# Patient Record
Sex: MALE | Race: WHITE | NOT HISPANIC OR LATINO | Employment: FULL TIME | URBAN - METROPOLITAN AREA
[De-identification: names, ages, dates, MRNs, and addresses within clinical notes are randomized per-mention and may not be internally consistent; named-entity substitution may affect disease eponyms.]

---

## 2017-03-02 ENCOUNTER — TRANSFERRED RECORDS (OUTPATIENT)
Dept: HEALTH INFORMATION MANAGEMENT | Facility: CLINIC | Age: 42
End: 2017-03-02

## 2018-03-06 ENCOUNTER — TELEPHONE (OUTPATIENT)
Dept: PEDIATRICS | Facility: CLINIC | Age: 43
End: 2018-03-06

## 2018-03-07 NOTE — TELEPHONE ENCOUNTER
"3/6/2018: Patient Communication Preferences indicate  Do not contact  and/or communication by \"Phone\" is not preferred. Call not required per Outreach team. SV      "

## 2019-08-15 ENCOUNTER — HOSPITAL ENCOUNTER (OUTPATIENT)
Dept: BEHAVIORAL HEALTH | Facility: CLINIC | Age: 44
Discharge: HOME | End: 2019-08-15
Attending: SOCIAL WORKER | Admitting: SOCIAL WORKER
Payer: COMMERCIAL

## 2019-08-15 PROCEDURE — H0001 ALCOHOL AND/OR DRUG ASSESS: HCPCS

## 2019-08-15 ASSESSMENT — ANXIETY QUESTIONNAIRES
5. BEING SO RESTLESS THAT IT IS HARD TO SIT STILL: SEVERAL DAYS
GAD7 TOTAL SCORE: 5
6. BECOMING EASILY ANNOYED OR IRRITABLE: SEVERAL DAYS
2. NOT BEING ABLE TO STOP OR CONTROL WORRYING: SEVERAL DAYS
7. FEELING AFRAID AS IF SOMETHING AWFUL MIGHT HAPPEN: NOT AT ALL
3. WORRYING TOO MUCH ABOUT DIFFERENT THINGS: SEVERAL DAYS
1. FEELING NERVOUS, ANXIOUS, OR ON EDGE: SEVERAL DAYS
IF YOU CHECKED OFF ANY PROBLEMS ON THIS QUESTIONNAIRE, HOW DIFFICULT HAVE THESE PROBLEMS MADE IT FOR YOU TO DO YOUR WORK, TAKE CARE OF THINGS AT HOME, OR GET ALONG WITH OTHER PEOPLE: NOT DIFFICULT AT ALL
4. TROUBLE RELAXING: NOT AT ALL

## 2019-08-15 ASSESSMENT — PAIN SCALES - GENERAL: PAINLEVEL: NO PAIN (0)

## 2019-08-15 ASSESSMENT — PATIENT HEALTH QUESTIONNAIRE - PHQ9: SUM OF ALL RESPONSES TO PHQ QUESTIONS 1-9: 2

## 2019-08-15 NOTE — PROGRESS NOTES
[facility]  [address], [address]  [place] [address]        ADULT CD ASSESSMENT ADDENDUM      Patient Name: Jose Peralta  Cell Phone:   Home: none (home)    Mobile:   Telephone Information:   Mobile [phone]       Email:  None  Emergency Contact: Chanda Trevizo   Tel: [phone]    The patient reported being:      With which race do you identify? White    Initial Screening Questions     1. Are you currently having severe withdrawal symptoms that are putting yourself or others in danger?  No    2. Are you currently having severe medical problems that require immediate attention?  No    3. Are you currently having severe emotional or behavioral problems that are putting yourself or others at risk of harm?  No    4. Do you have sufficient reading skills that will enable you to understand written materials, including the program rules and client rights materials?  Yes     Family History and other additional information     Who raised you? (parents, grandparents, adoptive parents, step-parents, etc.)    Both Parents    Please tell me what it was like growing up in your family. (please include any history of substance abuse, mental health issues, emotional/physical/sexual abuse, forms of discipline, and support)     Raised by biological parents, still .  2 sister and 1 brother, youngest.  Family hx substance use, but unknown because people are high functioning.    Do you have any children or Stepchildren? Yes, explain: 2 sons    Are you being investigated by Child Protection Services? No    Do you have a child protection worker, probation office or ?  No    How would you describe your current finances?  Doing okay    If you are having problems, (unpaid bills, bankruptcy, IRS problems) please explain:  No    If working or a student are you able to function appropriately in that setting? Yes     Describe your preferred learning style:  by hands-on practice and  by watching someone else demonstrate    What are your some of your personal strengths?  Goldei, and done it before.    Do you currently participate in community goldie activities, such as attending Islam, temple, Yazidi or Gnosticist services?  Yes, explain: I go every once in awhile    How does your spirituality impact your recovery?  Supports being a good person    Do you currently self-administer your medications?  The patient is not currently taking any prescription or over the counter medications.    Have you ever had to lie to people important to you about how much you murray?   No   Have you ever felt the need to bet more and more money?   No   Have you ever attempted treatment for a gambling problem?   No   Have you ever touched or fondled someone else inappropriately or forced them to have sex with you against their will?   No   Are you or have you ever been a registered sex offender?   No   Is there any history of sexual abuse in your family? No   Have you ever felt obsessed by your sexual behavior, such as having sex with many partners, masturbating often, using pornography often?   No     Have you ever received therapy or stayed in the hospital for mental health problems?   No     Have you ever hurt yourself, such as cutting, burning or hitting yourself?   No     Have you ever purged, binged or restricted yourself as a way to control your weight?   No     Are you on a special diet?   No     Do you have any concerns regarding your nutritional status?   No     Have you had any appetite changes in the last 3 months?   No   Have you had weight loss or weight gain of more than 10 lbs in the last 3 months?   If patient gained or lost more than 10 lbs, then refer to program RN / attending Physician for assessment.   No   Was the patient informed of BMI?       No   Have you engaged in any risk-taking behavior that would put you at risk for exposure to blood-borne or sexually transmitted diseases?   No   Do you  have any dental problems?   No   Have you ever lived through any trauma or stressful life events?   Yes, explain: current custody issues   In the past month, have you had any of the following symptoms related to the trauma listed above? (dreams, intense memories, flashbacks, physical reactions, etc.)   No   Have you ever believed people were spying on you, or that someone was plotting against you or trying to hurt you?   No   Have you ever believed someone was reading your mind or could hear your thoughts or that you could actually read someone's mind or hear what another person was thinking?   No   Have you ever believed that someone of some force outside of yourself was putting thoughts into your mind or made you act in a way that was not your usual self?  Have you ever though you were possessed?   No   Have you ever believed you were being sent special messages through the TV, radio or newspaper?   No   Have you ever heard things other people couldn't hear, such as voices or other noises?   No   Have you ever had visions when you were awake?  Or have you ever seen things other people couldn't see?   No   Do you have a valid 's license?    Yes     PHQ-9, LUIS-7 and Suicide Risk Assessm2 out of 27, indicating minimal depression.ent   PHQ-9 on 8/15/2019 LUIS-7 on 8/15/2019   The patient's PHQ-9 score was    The patient's LUIS-7 score was 5 out of 21, indicating mild anxiety.       [place]-Suicide Severity Rating Scale   Suicide Ideation   1.) Have you ever wished you were dead or that you could go to sleep and not wake up?     Lifetime:  No   Past Month:  No     2.) Have you actually had any thoughts of killing yourself?   Lifetime:  No   Past Month:  No     3.) Have you been thinking about how you might do this?     Lifetime:  No   Past Month:  No     4.) Have you had these thoughts and had some intention of acting on them?     Lifetime:  No   Past Month:  No     5.) Have you started to work out the details of  how to kill yourself?   Lifetime:  No   Past Month:  No     6.) Do you intend to carry out this plan?      Lifetime:  No   Past Month:  No     Intensity of Ideation   Intensity of ideation (1 being least severe, 5 being most severe):     Lifetime:  The patient denied ever having any suicidal thoughts in life.   Past Month:  The patient denied having any suicidal thoughts within the past month.     How often do you have these thoughts?  The patient denied ever having any suicidal thoughts in life.     When you have the thoughts how long do they last?  The patient denied ever having any suicidal thoughts in life.     Can you stop thinking about killing yourself or wanting to die if you want to?  The patient denied ever having any suicidal thoughts in life.     Are there things - anyone or anything (i.e. family, Taoist, pain of death) that stopped you from wanting to die or acting on thoughts of suicide?  Does not apply     What sort of reasons did you have for thinking about wanting to die or killing yourself (ie end pain, stop how you were feeling, get attention or reaction, revenge)?  Does not apply     Suicidal Behavior   (Suicide Attempt) - Have you made a suicide attempt?     Lifetime:  The patient had never made a suicide attempt.   Past Month:  The patient had not made a suicide attempt within the past month.     Have you engaged in self-harm (non-suicidal self-injury)?  The patient denied having any history of engaging in self-harm (non-suicidal self-injury).     (Interrupted Attempt) - Has there been a time when you started to do something to end your life but someone or something stopped you before you actually did anything?  The patient denied having any history of an interrupted suicide attempt.     (Aborted or Self-Interrupted Attempt) - Has there been a time when you started to do something to try to end your life but you stopped yourself before you actually did anything?  The patient denied having any  "history of an aborted or self-interrupted suicide attempt.     (Preparatory Acts of Behavior) - Have you taken any steps towards making suicide attempt or preparing to kill yourself (such as collecting pills, getting a gun, giving valuables away or writing a suicide note)?  No     Actual Lethality/Medical Damage:  The patient denied ever making a suicidal attempt.       2008  [facility]  Used with permission by Tamika Agee, PhD.       Guide to C-SSRS Risk Ratings   NO IDEATION:  with no active thoughts IDEATION: with a wish to die. IDEATION: with active thoughts. Risk Ratings   If Yes No No 0 - Very Low Risk   If NA Yes No 1 - Low Risk   If NA Yes Yes 2 - Low/moderate risk   IDEATION: associated thoughts of methods without intent or plan INTENT: Intent to follow through on suicide PLAN: Plan to follow through on suicide Risk Ratings cont...   If Yes No No 3 - Moderate Risk   If Yes Yes No 4 - High Risk   If Yes Yes Yes 5 - High Risk   The patient's ADDITIONAL RISK FACTORS and lack of PROTECTIVE FACTORS may increase their overall suicide risk ratings.     Additional Risk Factors:    A recent loss that was significant to the patient, i.e. loss of job, loss of home, divorce, break-up, etc.     Substance use   Protective Factors:    Having people in his/her life that would prevent the patient from considering a suicide attempt (i.e. young children, spouse, parents, etc.)     Having easy access to supportive family members     Risk Status   Past month:0. - Very Low Risk:  Evaluation Counselors:  [facility] \"Very Low Risk\".      Treatment Counselors:  Reassess upon admission as applicable, assess weekly in progress notes under Dimension 3 and summarize in Discharge / Treatment summary under Dimension 3.    Past 24 hours:0. - Very Low Risk:  Evaluation Counselors:  Document in Epic / [facility] to counselor \"Very Low Risk\".      Treatment Counselors:  Reassess upon " admission as applicable, assess weekly in progress notes under Dimension 3 and summarize in Discharge / Treatment summary under Dimension [facility].   Additional information to support suicide risk rating: There was no additional information to provide at this time.     Mental Health Status   Physical Appearance/Attire: Appears stated age and Attire appropriate to age/situation   Hygiene: well groomed   Eye Contact: at examiner   Speech Rate:  regular   Speech Volume: regular   Speech Quality: fluid   Cognitive/Perceptual:  reality based   Cognition: memory intact    Judgment: intact   Insight: intact   Orientation:  time, place, person and situation   Thought: logical    Hallucinations:  none   General Behavioral Tone: cooperative   Psychomotor Activity: no problem noted   Gait:  no problem   Mood: appropriate   Affect: congruence/appropriate   Counselor Notes: NA     Criteria for Diagnosis: DSM-5 Criteria for Substance Use Disorders      The patient does not currently identify with a DSM-5 substance use disorder.    Level of Care   I.) Intoxication and Withdrawal: 0   II.) Biomedical:  0   III.) Emotional and Behavioral:  1   IV.) Readiness to Change:  1   V.) Relapse Potential: 1   VI.) Recovery Environmental: 1     Initial Problem List     The patient has current child protection and/or child custody issues    Patient/Client is willing to follow treatment recommendations.  Yes    Counselor: Marcy Valera [facility]      Vulnerable Adult Checklist for OUTPATIENTS     1.  Do you have a physical, emotional or mental infirmity or dysfunction?       No    2.  Does this issue impair your ability to provide for your own care without help, including providing yourself with food, shelter, clothing, healthcare or supervision?       No    3.  Because of this issue, I need assistance to protect myself from maltreatment by others.      No    Based on the above information:    This person is not a functional Vulnerable Adult  according to [place] Statute 626.5572 subdivision 21.

## 2019-08-15 NOTE — PROGRESS NOTES
Rule 25 Assessment  Background Information   1. Date of Assessment Request  2. Date of Assessment  8/15/2019 3. Date Service Authorized     4.   KALEY Nolan   5.  Phone Number   [phone] 6. Referent   7. Assessment Site  [facility]     8. Client Name   Jose Peralta 9. Date of Birth  1975 Age  44 year old 10. Gender  male  11. PMI/ Insurance No.  [id number]   12. Client's Primary Language:  English 13. Do you require special accommodations, such as an  or assistance with written material? No   14. Current Address: [address] [place] [address]   15. Client Phone Numbers: none (home)      16. Tell me what has happened to bring you here today.    When I had my youngest son, I told her I would not drink around him and I was sober at that time. I have since started drinking again and I was honest with her that I did drink one night after he want to bed and then she went and got a  and took custody of my son. I was only able to have supervised visit so I now have a  too and we are going through this whole court thing.     I did have a hair test it tested positive for marijuana and cocaine.    17. Have you had other rule 25 assessments?     No    DIMENSION I - Acute Intoxication /Withdrawal Potential   1. Chemical use most recent 12 months outside a facility and other significant use history (client self-report)              X = Primary Drug Used   Age of First Use Most Recent Pattern of Use and Duration   Need enough information to show pattern (both frequency and amounts) and to show tolerance for each chemical that has a diagnosis   Date of last use and time, if needed   Withdrawal Potential? Requiring special care Method of use  (oral, smoked, snort, IV, etc)      Alcohol     17 past 2-3 years: 2x/month, 1-8 beers (50/50)  1037-4298: No use  1590-8267 (): 3-4x/week, binge drinking   8/7/19 no oral       Marijuana/  Hashish   17 Past 2-3 years: Up to cpl times/week, 1-2 hits.  Eighth/month.  9125-1987: no use  Prior: daily 2 weeks ago no smoke      Cocaine/Crack     Mid 20's Cocaine  Past 2-3 years: 3x  2013-: no use  Prior: weekly (with the drinking) 2019 no snort      Meth/  Amphetamines   No use          Heroin     No use          Other Opiates/  Synthetics   No use          Inhalants     No use          Benzodiazepines     No use          Hallucinogens     No use          Barbiturates/  Sedatives/  Hypnotics No use          Over-the-Counter Drugs   No use          Other     No use          Nicotine     unsp Occasional  cpl times/month        2. Do you use greater amounts of alcohol/other drugs to feel intoxicated or achieve the desired effect?  Yes.  Or use the same amount and get less of an effect?  Yes.  Example: in the past    3A. Have you ever been to detox?     Yes    3B. When was the first time?      shortly at [facility]    3C. How many times since then?     0    3D. Date of most recent detox:         4.  Withdrawal symptoms: Have you had any of the following withdrawal symptoms?  Past 12 months Recent (past 30 days)   None None     's Visual Observations and Symptoms: alert and oriented    Based on the above information, is withdrawal likely to require attention as part of treatment participation?  No    Dimension I Ratings   Acute intoxication/Withdrawal potential - The placing authority must use the criteria in Dimension I to determine a client s acute intoxication and withdrawal potential.    RISK DESCRIPTIONS - Severity ratin Client displays full functioning with good ability to tolerate and cope with withdrawal discomfort. No signs or symptoms of intoxication or withdrawal or resolving signs or symptoms.    REASONS SEVERITY WAS ASSIGNED (What about the amount of the person s use and date of most recent use and history of withdrawal problems suggests the potential of  withdrawal symptoms requiring professional assistance? )     No concern.         DIMENSION II - Biomedical Complications and Conditions   1a. Do you have any current health/medical conditions?(Include any infectious diseases, allergies, or chronic or acute pain, history of chronic conditions)       No    1b. On a scale of mild, moderate to severe please specify the severity of the patient's diabetes and/or neuropathy.    The patient denied having a history of being diagnosed with diabetes or neuropathy.    2. Do you have a health care provider? When was your most recent appointment? What concerns were identified?     [facility]    3. If indicated by answers to items 1 or 2: How do you deal with these concerns? Is that working for you? If you are not receiving care for this problem, why not?      The patient denied having any current clinical health issues.    4A. List current medication(s) including over-the-counter or herbal supplements--including pain management:     None    4B. Do you follow current medical recommendations/take medications as prescribed?     The patient denied taking any prescription or over the counter medications at this time.    4C. When did you last take your medication?     The patient denied taking any prescription or over the counter medications at this time.    4D. Do you need a referral to have a follow up with a primary care physician?    No.    5. Has a health care provider/healer ever recommended that you reduce or quit alcohol/drug use?     No    6. Are you pregnant?     NA, because the patient is male    7. Have you had any injuries, assaults/violence towards you, accidents, health related issues, overdose(s) or hospitalizations related to your use of alcohol or other drugs:     No    8. Do you have any specific physical needs/accommodations? No    Dimension II Ratings   Biomedical Conditions and Complications - The placing authority must use the criteria in Dimension II to determine a  client s biomedical conditions and complications.   RISK DESCRIPTIONS - Severity ratin Client displays full functioning with good ability to cope with physical discomfort.    REASONS SEVERITY WAS ASSIGNED (What physical/medical problems does this person have that would inhibit his or her ability to participate in treatment? What issues does he or she have that require assistance to address?)    No health concerns reported.         DIMENSION III - Emotional, Behavioral, Cognitive Conditions and Complications   1. (Optional) Tell me what it was like growing up in your family. (substance use, mental health, discipline, abuse, support). Do you have a family history of Substance Use Disorders?    Raised by biological parents, still .  2 sister and 1 brother, youngest.  Family hx substance use, but unknown because people are high functioning.    2. When was the last time that you had significant problems...  A. with feeling very trapped, lonely, sad, blue, depressed or hopeless  about the future? Never    B. with sleep trouble, such as bad dreams, sleeping restlessly, or falling  asleep during the day? Past Month    C. with feeling very anxious, nervous, tense, scared, panicked, or like  something bad was going to happen? Never    D. with becoming very distressed and upset when something reminded  you of the past? Past Month    E. with thinking about ending your life or committing suicide? Never    3. When was the last time that you did the following things two or more times?  A. Lied or conned to get things you wanted or to avoid having to do  something? Never    B. Had a hard time paying attention at school, work, or home? Past Month    C. Had a hard time listening to instructions at school, work, or home? Never    D. Were a bully or threatened other people? Never    E. Started physical fights with other people? Never    Note: These questions are from the Global Appraisal of Individual Needs--Short Screener.  Any item marked  past month  or  2 to 12 months ago  will be scored with a severity rating of at least 2.     For each item that has occurred in the past month or past year ask follow up questions to determine how often the person has felt this way or has the behavior occurred? How recently? How has it affected their daily living? And, whether they were using or in withdrawal at the time?    If I wake up I have a hard time to going back to sleep now with everything going on.  Since all this happened I am mad, real mad and not being myself.    4A. If the person has answered item 2E with  in the past year  or  the past month , ask about frequency and history of suicide in the family or someone close and whether they were under the influence.     Denies.    Any history of suicide in your family? Or someone close to you?     The patient denied any family member or someone close to the patient had ever completed suicide.    4B. If the person answered item 2E  in the past month  ask about  intent, plan, means and access and any other follow-up information  to determine imminent risk. Document any actions taken to intervene  on any identified imminent risk.      The patient denied having any suicide ideation within the past month.    5A. Have you ever been diagnosed with a mental health problem?     No      5B. Are you receiving care for any mental health issues? If yes, what is the focus of that care or treatment?  Are you satisfied with the service? Most recent appointment?  How has it been helpful?     I am seeing a devyn periodically as I can around.  It is a family psychologist, last appt 2 weeks.  It is helpful.    6. Have you been prescribed medications for emotional/psychological problems?     The patient denied having any history of being prescribed psychotropic medications for mental health issues.    7. Does your MH provider know about your use?     Yes.  7B. What does he or she have to say about it?(DSM) the same  thing about the vulnerablity.    8A. Have you ever been verbally, emotionally, physically or sexually abused?      No     Follow up questions to learn current risk, continuing emotional impact.      The patient denied having any history of being verbally, emotionally, physically or sexually abused.    8B. Have you received counseling for abuse?      The patient denied having any history of being verbally, emotionally, physically or sexually abused.    9. Have you ever experienced or been part of a group that experienced community violence, historical trauma, rape or assault?     No    10A. :    No    11. Do you have problems with any of the following things in your daily life?    Concentration      Note: If the person has any of the above problems, follow up with items 12, 13, and 14. If none of the issues in item 11 are a problem for the person, skip to item 15.    I am just in a rut right now.    12. Have you been diagnosed with traumatic brain injury or Alzheimer s?  No    13. If the answer to #12 is no, ask the following questions:    Have you ever hit your head or been hit on the head? Yes    Were you ever seen in the Emergency Room, hospital or by a doctor because of an injury to your head? No    Have you had any significant illness that affected your brain (brain tumor, meningitis, West Nile Virus, stroke or seizure, heart attack, near drowning or near suffocation)? No    14. If the answer to #12 is yes, ask if any of the problems identified in #11 occurred since the head injury or loss of oxygen. No    15A. Highest grade of school completed:     Some college, but no degree    15B. Do you have a learning disability? No    15C. Did you ever have tutoring in Math or English? No    15D. Have you ever been diagnosed with Fetal Alcohol Effects or Fetal Alcohol Syndrome? No    16. If yes to item 15 B, C, or D: How has this affected your use or been affected by your use?     The patient denied having any  history of a learning disability, tutoring in math or English or being diagnosed with Fetal Alcohol Effects or Fetal Alcohol Syndrome.    Dimension III Ratings   Emotional/Behavioral/Cognitive - The placing authority must use the criteria in Dimension III to determine a client s emotional, behavioral, and cognitive conditions and complications.   RISK DESCRIPTIONS - Severity ratin Client has impulse control and coping skills. Client presents a mild to moderate risk of harm to self or others or displays symptoms of emotional, behavioral or cognitive problems. Client has a mental health diagnosis and is stable. Client functions adequately in significant life areas.    REASONS SEVERITY WAS ASSIGNED - What current issues might with thinking, feelings or behavior pose barriers to participation in a treatment program? What coping skills or other assets does the person have to offset those issues? Are these problems that can be initially accommodated by a treatment provider? If not, what specialized skills or attributes must a provider have?    Client denies any history of mental health diagnosis.  He does report currently seeing an individual therapist.  He has some significant stressors right now which he acknowledges are creating some unsettled emotions.  He denies any suicidal ideation.  PHQ-9 score and LUIS-7 score indicate minimal depression and mild anxiety.         DIMENSION IV - Readiness for Change   1. You ve told me what brought you here today. (first section) What do you think the problem really is?     I don't think I am chemically dependent anymore, I do indulge a little bit now.    2. Tell me how things are going. Ask enough questions to determine whether the person has use related problems or assets that can be built upon in the following areas: Family/friends/relationships; Legal; Financial; Emotional; Educational; Recreational/ leisure; Vocational/employment; Living arrangements (DSM)      Custody  issues of youngest son.     3. What activities have you engaged in when using alcohol/other drugs that could be hazardous to you or others (i.e. driving a car/motorcycle/boat, operating machinery, unsafe sex, sharing needles for drugs or tattoos, etc     Hx of driving    4. How much time do you spend getting, using or getting over using alcohol or drugs? (DSM)     Minimal    5. Reasons for drinking/drug use (Use the space below to record answers. It may not be necessary to ask each item.)  Like the feeling Yes   Trying to forget problems No   To cope with stress No   To relieve physical pain No   To cope with anxiety Yes   To cope with depression No   To relax or unwind Yes   Makes it easier to talk with people No   Partner encourages use No   Most friends drink or use No   To cope with family problems No   Afraid of withdrawal symptoms/to feel better No   Other (specify)  No     A. What concerns other people about your alcohol or drug use/Has anyone told you that you use too much? What did they say? (DSM)     I know it worries my mom.  I think the concerns have been with the progression of the use, and concern I will go back down that road.    B. What did you think about that/ do you think you have a problem with alcohol or drug use?     I don't really have the consequences anymore, this was just th perfect storm that hit when I took this test.  I do have the same concerns.    6. What changes are you willing to make? What substance are you willing to stop using? How are you going to do that? Have you tried that before? What interfered with your success with that goal?      Get through this court stuff and then we will see.  I will do whatever I have to do.  I'm going to have to go to [facility] and have a breathalyzer.    I think for me it is abstinence right now.    7. What would be helpful to you in making this change?     I need to take care of myself, my mind and my body.  I need to use the tools I know and  surround myself with my support.    Dimension IV Ratings   Readiness for Change - The placing authority must use the criteria in Dimension IV to determine a client s readiness for change.   RISK DESCRIPTIONS - Severity ratin Client is motivated with active reinforcement, to explore treatment and strategies for change, but ambivalent about illness or need for change.    REASONS SEVERITY WAS ASSIGNED - (What information did the person provide that supports your assessment of his or her readiness to change? How aware is the person of problems caused by continued use? How willing is she or he to make changes? What does the person feel would be helpful? What has the person been able to do without help?)      Client appeared forthcoming and was compliant with interview.  He recognizes abstinence in his best interest.         DIMENSION V - Relapse, Continued Use, and Continued Problem Potential   1A. In what ways have you tried to control, cut-down or quit your use? If you have had periods of sobriety, how did you accomplish that? What was helpful? What happened to prevent you from continuing your sobriety? (DSM)     Sober for 5 years    1B. What were the circumstances of your most recent relapse with mood altering chemicals?    I just kind of slid out of it.    2. Have you experienced cravings? If yes, ask follow up questions to determine if the person recognizes triggers and if the person has had any success in dealing with them.     Not really, I get excited sometimes when I know I am going to have some beers but not lately.    3. Have you been treated for alcohol/other drug abuse/dependence? Please List the names/locations/approximate dates of previous treatments:    20 years ago [facility] (outpatient)   [place], I knew I needed help.  2013 [facility] and then went to [facility] afterward.    4. Support group participation: Have you/do you attend support group meetings to reduce/stop your alcohol/drug  use? How recently? What was your experience? Are you willing to restart? If the person has not participated, is he or she willing?     I have flirted with going back to AA but not sure.  I was doing the deal for most of the time when I was sober.    5. What would assist you in staying sober/straight?     Getting back reconnected with AA and support people.    Dimension V Ratings   Relapse/Continued Use/Continued problem potential - The placing authority must use the criteria in Dimension V to determine a client s relapse, continued use, and continued problem potential.   RISK DESCRIPTIONS - Severity ratin Client recognizes relapse issues and prevention strategies, but displays some vulnerability for further substance use or mental health problems.    REASONS SEVERITY WAS ASSIGNED - (What information did the person provide that indicates his or her understanding of relapse issues? What about the person s experience indicates how prone he or she is to relapse? What coping skills does the person have that decrease relapse potential?)      Client reports a period of abstinence.  He admits to recent use and is experiencing some negative consequences as a result. Client has had previous treatment and is able to identify coping mechanisms to avoid problems with use and maintain abstinence.           DIMENSION VI - Recovery Environment   1. Are you employed/attending school? Tell me about that.     Work for a [employer].  .    2A. Describe a typical day; evening for you. Work, school, social, leisure, volunteer, spiritual practices. Include time spent obtaining, using, recovering from drugs or alcohol. (DSM)     Work 8-5, Monday through Friday.  And then this summer it has been traveling baseball which was a huge committment.  I mow the lawn, dishes and laundry.    Please describe what leisure activities have been associated with your substance abuse:     Sometimes just to relax or at an  event.    2B. How often do you spend more time than you planned using or use more than you planned? (DSM)     None current.    3. How important is using to your social connections? Do many of your family or friends use?     I am still really plugged into the sober community.    4A. Are you currently in a significant relationship?     Yes.  4B. How long? About 2 years, but currently have some space between us            Please describe your significant other's use of mood altering chemicals? Social use    4C. Sexual Orientation:     Heterosexual    5A. Who do you live with?      Son half the time.    5B. Tell me about their alcohol/drug use and mental health issues.     Live alone most times    5C. Are you concerned for your safety there? No    5D. Are you concerned about the safety of anyone else who lives with you? No    6A. Do you have children who live with you?     Yes.  (Ask follow-up questions to determine the person's relationship and responsibility, both legal and care giving; and what arrangements are made for supervision for the children when the person is not available.) 50/50 with my first son (11yo)    6B. Do you have children who do not live with you?     Yes.  (Ask follow-up questions to determine the person's relationship and responsibility, both legal and care giving; and what arrangements are made for supervision for the children when the person is not available.) son (3yo), right now have supervised visits so do that with my mom.    7A. Who supports you in making changes in your alcohol or drug use? Would you like your family to participate in your treatment? If so, how? What are they willing to do to support you? Who is upset or angry about you making changes in your alcohol or drug use? How big a problem is this for you?      Many friends and family, girlfriend    7B. This table is provided to record information about the person s relationships and available support It is not necessary to ask  each item; only to get a comprehensive picture of their support system.  How often can you count on the following people when you need someone?   Partner / Spouse Usually supportive   Parent(s)/Aunt(s)/Uncle(s)/Grandparents Always supportive   Sibling(s)/Cousin(s) Always supportive   Child(elaine) Always supportive   Other relative(s) Always supportive   Friend(s)/neighbor(s) Usually supportive   Child(elaine) s father(s)/mother(s) Always supportive  Rarely supportive   Support group member(s) Always supportive   Community of ute members Always supportive   /counselor/therapist/healer Always supportive   Other (specify) No       8A. What is your current living situation?     Independent living    8B. What is your long term plan for where you will be living?     No change.    8C. Tell me about your living environment/neighborhood? Ask enough follow up questions to determine safety, criminal activity, availability of alcohol and drugs, supportive or antagonistic to the person making changes.      No concerns.    9. Criminal justice history: Gather current/recent history and any significant history related to substance use--Arrests? Convictions? Circumstances? Alcohol or drug involvement? Sentences? Still on probation or parole? Expectations of the court? Current court order? Any sex offenses - lifetime? What level? (DSM)    Denies any current legal or probation  Hx: 3x DWI's (20 years ago), 1 marijuana possession    10. What obstacles exist to participating in treatment? (Time off work, childcare, funding, transportation, pending shelter time, living situation)     The patient denied having any obstacles for participating in substance abuse treatment.    Dimension VI Ratings   Recovery environment - The placing authority must use the criteria in Dimension VI to determine a client s recovery environment.   RISK DESCRIPTIONS - Severity ratin Client has passive social  or family and significant  other are not interested in the client's recovery. The client is engaged in structured meaningful activity.    REASONS SEVERITY WAS ASSIGNED - (What support does the person have for making changes? What structure/stability does the person have in his or her daily life that will increase the likelihood that changes can be sustained? What problems exist in the person s environment that will jeopardize getting/staying clean and sober?)     Client reports that he is single, and has two sons which he is currently having custody issues with.  He is employed full-time and denies any job concerns.  He has supportive relationships and people that support sobriety.  He denies any current legal issues.         Client Choice/Exceptions   What is your cultural identification?      Would you like services specific to language, age, gender, culture, Tenriism preference, race, ethnicity, sexual orientation or disability?  No    What particular treatment choices and options would you like to have? None    Do you have a preference for a particular treatment program? None    Criteria for Diagnosis     Criteria for Diagnosis  DSM-5 Criteria for Substance Use Disorder  Instructions: Determine whether the client currently meets the criteria for Substance Use Disorder using the diagnostic criteria in the DSM-V pp.481-589. Current means during the most recent 12 months outside a facility that controls access to substances    Category of Substance Severity (ICD-10 Code / DSM 5 Code)     Alcohol Use Disorder The patient does not currently meet the criteria for an Alcohol use disorder, but has a history of moderate use disorder.   Cannabis Use Disorder The patient does not currently meet the criteria for an Cannabis use disorder, but has a history of moderate use disorder.   Hallucinogen Use Disorder The patient does not meet the criteria for a Hallucinogen use disorder.   Inhalant Use Disorder The patient does not meet the criteria  for an Inhalant use disorder.   Opioid Use Disorder The patient does not meet the criteria for an Opioid use disorder.   Sedative, Hypnotic, or Anxiolytic Use Disorder The patient does not meet the criteria for a Sedative/Hypnotic use disorder.   Stimulant Related Disorder The patient does not currently meet the criteria for a Stimulant use disorder, but has a history of moderate use disorder.   Tobacco Use Disorder The patient does not meet the criteria for a Tobacco use disorder.   Other (or unknown) Substance Use Disorder The patient does not meet the criteria for a Other (or unknown) Substance use disorder.       Collateral Contact Summary   Number of contacts made: 2    Contact with referring person:  No    If court related records were reviewed, summarize here: No court records had been reviewed at the time of this documentation.    Information from collateral contacts supported/largely agreed with information from the client and associated risk ratings.      Rule 25 Assessment Summary and Plan   's Recommendation    Client is recommended to continue attending individual therapy.        Collateral Contacts     Name:    Carlyn Peralta   Relationship:    Ex-wife   Phone Number:    [phone] Releases:    Yes     I do get concerns about what he drinks, and I know he has not been passing drug tests the past couple months.  He is trying to be a better person and he is a good person, but in my mind he is an addict and I think he can be in denial of that.  Our son has expressed his own concern about him drinking in front of him and has said he does not want to be around him when he is drinking.  He does have periods of when he does not drink, and he was sober for a long time.      Collateral Contacts     Name:    Erica Ryan   Relationship:    Ex-girlfriend   Phone Number:    [phone]   Releases:    Yes     My concern mostly is for the safety of our son.  He is a completely different person when he is  not sober and makes bad decisions and that scares me.  We are not together so I cannot attest at how much and how often he uses.  He did have a drug test that came back positive for cocaine and with what would be considered moderate usage.  With drinking there are more signs of it as he doesn't show up and bails on stuff, lying and hiding.  I have just known it seems to be getting bad again.  History is he has been in treatment, has had multiple DUI's, when he is drinking he drinks to drink.  He has drank in the morning and does the drugs in the morning so it is all int he past and because of his history it is hard to take his word for it.  I think it is still an issue for him.    minda Contacts      A problematic pattern of alcohol/drug use leading to clinically significant impairment or distress, as manifested by at least two of the following, occurring within a 12-month period:    6.) Continued alcohol use despite having persistent or recurrent social or interpersonal problems caused or exacerbated by the effects of alcohol/drug.      Specify if: In early remission:  After full criteria for alcohol/drug use disorder were previously met, none of the criteria for alcohol/drug use disorder have been met for at least 3 months but for less than 12 months (with the exception that Criterion A4,  Craving or a strong desire or urge to use alcohol/drug  may be met).     In sustained remission:   After full criteria for alcohol use disorder were previously met, none of the criteria for alcohol/drug use disorder have been met at any time during a period of 12 months or longer (with the exception that Criterion A4,  Craving or strong desire or urge to use alcohol/drug  may be met).   Specify if:   This additional specifier is used if the individual is in an environment where access to alcohol is restricted.    Mild: Presence of 2-3 symptoms  Moderate: Presence of 4-5 symptoms  Severe: Presence of 6 or more symptoms

## 2019-08-16 ASSESSMENT — ANXIETY QUESTIONNAIRES: GAD7 TOTAL SCORE: 5

## 2019-08-19 NOTE — PROGRESS NOTES
Visit Date:   08/15/2019      CHEMICAL DEPENDENCY ASSESSMENT      EVALUATION COUNSELOR:  Marcy Valera [facility]    CLIENT'S ADDRESS:  [id number] [address].   TELEPHONE:  [phone].   STATISTICS:  Date of Birth 1975.  Age:  44.  Sex:  Male.  Marital Status:  .   INSURANCE:  [facility].   REFERRAL SOURCE:  .      REASON FOR EVALUATION:  Jose Peralta reports he has a history of substance use problems and has been through treatment.  He does have 2 children and currently he is going through a custody arrangement with 1 of his child's mother.  He did submit to a drug test and it was positive for marijuana and cocaine, and so at this time, he has been recommended for substance use evaluation.      HEALTH HISTORY AND MEDICATIONS:  Client denies any health conditions or concerns and denies any current medications.  He does go through [facility] for primary care issues.      HISTORY OF PREVIOUS TREATMENT AND COUNSELING:  Client reports 1 detox admission back in 2011 prior to entering treatment at [facility], which he reports he did complete.  He also attended a treatment program at [facility] in 2013 and does report sobriety following that.  Reports he has been in AA meetings; however, none recently.  He reports he is currently doing some individual therapy.  He denies any history of any hospitalizations, drug or alcohol use or mental health.      HISTORY OF ALCOHOL AND DRUG USE:  Client reports alcohol use, age of first use 17.  Reports for the past 2-3 years, he has been drinking about twice a month, anywhere from 1-8 beers per time, about 50-50 drinking on the higher end.  Reports he had no use of alcohol between 3607-1423, and prior to that was his heaviest use where he was binge drinking.  He reports last use of alcohol was 08/07/2019.  He also reports marijuana use, age of first use 17.  Reports over the past 2-3 years, he has been smoking pot a couple of times a week, 1-2  hits.  Reports usually buys 1/8 ounce and it lasts him about a month.  He reports no use from 3234-6190 and prior to that he was a daily marijuana smoker, and last use was 2 weeks ago.  Reports history of cocaine use, age of first use in his mid 20s.  Reports over the past 2-3 years he has used 3 times.  Again, between 8504-5697, he had no use but prior to that, he was using cocaine weekly, typically in conjunction with his alcohol use and last use was in 03/2019.  Reports an occasional tobacco user a couple of times a month, unspecified last use.  Client denies any other substance use.        SUMMARY OF CHEMICAL DEPENDENCY SYMPTOMS ACKNOWLEDGED BY CLIENT:  Client currently identifies with 1/11 DSM-V criteria for impression of a substance use disorder, which does not indicate a diagnosis of disorder.      SUMMARY OF COLLATERAL DATA:  I spoke with client's ex-wife, Carlyn Peralta, and she reports there are concerns regarding his alcohol use.  She is aware that he has not been passing drug test for the past couple of months.  She does confirm his past history.  I also spoke with Erica Ziegler, client's ex-girlfriend, with whom he is currently in custody issues.   She reports she cannot attest to how much and how often he uses, however, does have confirmation that his tests having been coming back positive.     MENTAL STATUS ASSESSMENT:  Physical appearance and attire:  Appears stated age, in attire appropriate to age and situation.  Hygiene well groomed.  Eye contact at examiner.  Speech regular, volume regular, quality fluid.  Cognitive perceptual reality based.  Cognition:  Memory intact, judgment intact, insight intact.  Orientation to time, place, person and situation.  Thoughts logical.  Hallucinations:  None.  General behavioral tone:  Cooperative.  Psychomotor activity:  No problem noted.  Gait:  No problem.  Mood appropriate.  Affect congruent and appropriate.      VULNERABLE ADULT ASSESSMENT:  This person is  not a functional vulnerable adult according to [place] Statute 626.5572, subdivision 21.      IMPRESSION:  Client does meet DSM-V criteria for substance use disorder at this time.      [facility] PLACEMENT CRITERIA:   DIMENSION 1:  Intoxication/withdrawal:  Risk level 0.  No concerns.      DIMENSION 2:  Biomedical Conditions:  Risk level 0.  No health concerns reported.      DIMENSION 3:  Emotional/Behavioral:  Risk level 1.  Client denies any history of mental health diagnosis.  He does report currently seeing an individual therapist.  He has some significant stressors right now, which he acknowledges are creating some unsettled emotions.  He denies any suicidal ideation.  PHQ-9 score and LUIS-7 score indicate minimal depression and mild anxiety.      DIMENSION 4:  Readiness to Change:  Risk level 1.  Client appeared forthcoming and was compliant with interview.  He recognizes abstinent is in his best interest.      DIMENSION 5:  Relapse and Continued Use Potential:  Risk level 1.  Client reports a period of abstinence.  He admits to recent use and experiencing negative consequences as a result.  Client has had previous treatment and is able to identify coping mechanisms to avoid problems with use and maintain abstinence.      DIMENSION 6:  Recovery Environment:  Risk level 1.  Client reports he is single and has 2 sons, with which he is currently having custody issues.  He is employed full-time and denies any job concerns.  He has supportive relationships with people that support sobriety.  He denies any current legal issues.      INITIAL PROBLEM LIST:  Current custody issues.      RECOMMENDATIONS:  Client is recommended to attend individual therapy with someone specializing in addiction.      RATIONALE:  At this time, client does not meet criteria for substance use disorder therefore does not meet criteria requiring substance use treatment.  Given third party concerns in regards to his substance use and client's  report of past concerns with use, individual therapy with an addiction specialist can support him in developing coping mechanisms for ongoing abstinence as well as coping with stressors in current situation.  If he is unable to maintain abstinence or unable to pass required drug tests for his custody issues, he should then be referred back for an updated chemical health assessment.         This information has been disclosed to you from records protected by Federal confidentiality rules (42 CFR part 2). The Federal rules prohibit you from making any further disclosure of this information unless further disclosure is expressly permitted by the written consent of the person to whom it pertains or as otherwise permitted by 42 CFR part 2. A general authorization for the release of medical or other information is NOT sufficient for this purpose. The Federal rules restrict any use of the information to criminally investigate or prosecute any alcohol or drug abuse patient.      MELQUIADES NEAL [facility] [place]             D: 2019   T: 2019   MT: LOIS      Name:     KAIA GURROLA   MRN:      [id number]        Account:      [id number]   :      1975           Visit Date:   08/15/2019      Document: [id number]

## 2019-11-06 ENCOUNTER — HEALTH MAINTENANCE LETTER (OUTPATIENT)
Age: 44
End: 2019-11-06

## 2020-09-03 ENCOUNTER — COMMUNICATION - HEALTHEAST (OUTPATIENT)
Dept: INTERNAL MEDICINE | Facility: CLINIC | Age: 45
End: 2020-09-03

## 2020-09-10 ENCOUNTER — COMMUNICATION - HEALTHEAST (OUTPATIENT)
Dept: INTERNAL MEDICINE | Facility: CLINIC | Age: 45
End: 2020-09-10

## 2020-09-23 ENCOUNTER — OFFICE VISIT - HEALTHEAST (OUTPATIENT)
Dept: INTERNAL MEDICINE | Facility: CLINIC | Age: 45
End: 2020-09-23

## 2020-09-23 ENCOUNTER — COMMUNICATION - HEALTHEAST (OUTPATIENT)
Dept: INTERNAL MEDICINE | Facility: CLINIC | Age: 45
End: 2020-09-23

## 2020-09-23 DIAGNOSIS — Q23.81 BICUSPID AORTIC VALVE: ICD-10-CM

## 2020-09-23 DIAGNOSIS — E78.1 HYPERTRIGLYCERIDEMIA: ICD-10-CM

## 2020-09-23 DIAGNOSIS — F19.20 CHEMICAL DEPENDENCY (H): ICD-10-CM

## 2020-09-23 LAB
ALBUMIN SERPL-MCNC: 4.2 G/DL (ref 3.5–5)
ALP SERPL-CCNC: 86 U/L (ref 45–120)
ALT SERPL W P-5'-P-CCNC: 17 U/L (ref 0–45)
AMPHETAMINES UR QL SCN: NORMAL
ANION GAP SERPL CALCULATED.3IONS-SCNC: 9 MMOL/L (ref 5–18)
AST SERPL W P-5'-P-CCNC: 19 U/L (ref 0–40)
BARBITURATES UR QL: NORMAL
BENZODIAZ UR QL: NORMAL
BILIRUB SERPL-MCNC: 0.3 MG/DL (ref 0–1)
BUN SERPL-MCNC: 17 MG/DL (ref 8–22)
CALCIUM SERPL-MCNC: 10.1 MG/DL (ref 8.5–10.5)
CANNABINOIDS UR QL SCN: NORMAL
CHLORIDE BLD-SCNC: 102 MMOL/L (ref 98–107)
CO2 SERPL-SCNC: 27 MMOL/L (ref 22–31)
COCAINE UR QL: NORMAL
CREAT SERPL-MCNC: 1.12 MG/DL (ref 0.7–1.3)
CREAT UR-MCNC: 145.7 MG/DL
GFR SERPL CREATININE-BSD FRML MDRD: >60 ML/MIN/1.73M2
GLUCOSE BLD-MCNC: 97 MG/DL (ref 70–125)
METHADONE UR QL SCN: NORMAL
OPIATES UR QL SCN: NORMAL
OXYCODONE UR QL: NORMAL
PCP UR QL SCN: NORMAL
POTASSIUM BLD-SCNC: 5.2 MMOL/L (ref 3.5–5)
PROT SERPL-MCNC: 7.3 G/DL (ref 6–8)
SODIUM SERPL-SCNC: 138 MMOL/L (ref 136–145)

## 2020-09-23 ASSESSMENT — MIFFLIN-ST. JEOR: SCORE: 1743.81

## 2020-09-24 ENCOUNTER — COMMUNICATION - HEALTHEAST (OUTPATIENT)
Dept: INTERNAL MEDICINE | Facility: CLINIC | Age: 45
End: 2020-09-24

## 2020-09-27 LAB
MISCELLANEOUS TEST DEPT. - HE HISTORICAL: NORMAL
PERFORMING LAB: NORMAL
SPECIMEN STATUS: NORMAL
TEST NAME: NORMAL

## 2020-11-05 ENCOUNTER — AMBULATORY - HEALTHEAST (OUTPATIENT)
Dept: LAB | Facility: CLINIC | Age: 45
End: 2020-11-05

## 2020-11-05 DIAGNOSIS — F19.20 CHEMICAL DEPENDENCY (H): ICD-10-CM

## 2020-11-09 ENCOUNTER — COMMUNICATION - HEALTHEAST (OUTPATIENT)
Dept: INTERNAL MEDICINE | Facility: CLINIC | Age: 45
End: 2020-11-09

## 2020-11-29 ENCOUNTER — HEALTH MAINTENANCE LETTER (OUTPATIENT)
Age: 45
End: 2020-11-29

## 2020-12-03 ENCOUNTER — AMBULATORY - HEALTHEAST (OUTPATIENT)
Dept: LAB | Facility: CLINIC | Age: 45
End: 2020-12-03

## 2020-12-03 DIAGNOSIS — F19.20 CHEMICAL DEPENDENCY (H): ICD-10-CM

## 2020-12-08 ENCOUNTER — COMMUNICATION - HEALTHEAST (OUTPATIENT)
Dept: INTERNAL MEDICINE | Facility: CLINIC | Age: 45
End: 2020-12-08

## 2020-12-10 ENCOUNTER — COMMUNICATION - HEALTHEAST (OUTPATIENT)
Dept: INTERNAL MEDICINE | Facility: CLINIC | Age: 45
End: 2020-12-10

## 2020-12-21 ENCOUNTER — COMMUNICATION - HEALTHEAST (OUTPATIENT)
Dept: INTERNAL MEDICINE | Facility: CLINIC | Age: 45
End: 2020-12-21

## 2020-12-21 ENCOUNTER — COMMUNICATION - HEALTHEAST (OUTPATIENT)
Dept: FAMILY MEDICINE | Facility: CLINIC | Age: 45
End: 2020-12-21

## 2021-01-18 ENCOUNTER — AMBULATORY - HEALTHEAST (OUTPATIENT)
Dept: LAB | Facility: CLINIC | Age: 46
End: 2021-01-18

## 2021-01-18 DIAGNOSIS — F19.20 CHEMICAL DEPENDENCY (H): ICD-10-CM

## 2021-03-01 ENCOUNTER — COMMUNICATION - HEALTHEAST (OUTPATIENT)
Dept: INTERNAL MEDICINE | Facility: CLINIC | Age: 46
End: 2021-03-01

## 2021-03-26 ENCOUNTER — AMBULATORY - HEALTHEAST (OUTPATIENT)
Dept: LAB | Facility: CLINIC | Age: 46
End: 2021-03-26

## 2021-03-26 DIAGNOSIS — F19.20 CHEMICAL DEPENDENCY (H): ICD-10-CM

## 2021-06-04 VITALS
TEMPERATURE: 98.5 F | HEART RATE: 86 BPM | BODY MASS INDEX: 25.76 KG/M2 | OXYGEN SATURATION: 99 % | SYSTOLIC BLOOD PRESSURE: 106 MMHG | DIASTOLIC BLOOD PRESSURE: 68 MMHG | HEIGHT: 71 IN | WEIGHT: 184 LBS

## 2021-06-11 NOTE — PROGRESS NOTES
[facility] Clinic Note  Jose Peralta   45 y.o. male    Date of Visit: 9/23/2020  Chief Complaint   Patient presents with     Establish Care     fasting     Labs Only     drug screen needed - blood draw per court order     Assessment/Plan  1. Chemical dependency (H)  As part of a court order to have monthly drug screen tests, will check requested labs.  We will continue to attend AA meetings and work with support services as part of his management of his chemical dependency.  We will need to put future orders in to be checked once monthly and facilitated him setting up a [facility] [employer] account to communicate with us.   - Drug Abuse 1+, Urine  - [facility] RED  - Comprehensive Metabolic Panel  - Drug Screen 9 panel serum ([facility] [id number]) - Misc. Lab Test; Standing  - Drug Screen 9 panel serum ([facility] [id number]) - Misc. Lab Test    2. Hypertriglyceridemia  Not fasting today.  Not on any medication.  Last FLP from 9/2019 at [facility] only remarkable for elevated triglycerides.  Provided some verbal counseling on minimizing processed foods, sweets and sugary drinks to help lower TG level.  Abstaining from alcohol as noted above.  Will recheck when he follow-ups in clinic in the next 2 to 3 months.  - Comprehensive Metabolic Panel    3. Bicuspid aortic valve  Noted on [facility] echocardiogram from September 2019 with EF 57% and no aortic valve stenosis.  Trivial eccentric aortic valve regurgitation.  No murmur on exam today.  Hemodynamically stable.     Much or all of the text in this note was generated through the use of Dragon Dictate voice-to-text software. Errors in spelling or words which seem out of context are unintentional. Sound alike errors, in particular, may have escaped editing  Jackson Ventura MD    Return if symptoms worsen or fail to improve.    Subjective  This 45 y.o. old male. Former distant [facility] patient. Had been seeing a doctor in [place] in the interim. Has a chemical dependency problem -  alcohol, THC and cocaine. Has an issue with custody issue with failing a hair follicle test. Needs a monthly blood draw, presumingly for the next couple of months. Has been sober for about 70 days from TH, and 3 months from cocaine. Attending AA meetings and does some service work for retreat. Is not in therapy, but does have people to talk from his AA meetings. Feels safe at home. Live alone. No SI/HI.   Does have chronic bilateral medical knee pain. From 9/2019 LDL 79, HDL 58. LFTs from 10/2015 WNL. Refused flu shots. States he has been drinking since age 17. Has had a colonocsopy in setting of sister having a polyp.   Reviewed records and labs from [facility] and [place] noting that patient had significant mixed hyperlipidemia back in 7900-5505, however by September 2019 LDL was 79, HDL 58 and total cholesterol 183 with TG of 228.  He is not fasting today.  Reviewed court ordered instructions, requesting monthly drug screens for: Amphetamines, barbiturates, BZDs, methadone, opiates, oxycodone, THC, occaine, ecstacy, phencyclidine, propoxyphene. Creatinine, pH and nitrite for validity.     ROS A comprehensive review of systems was performed and was otherwise negative    Medications, allergies, and problem list were reviewed and updated    Exam  General appearance: Pleasant, nontoxic-appearing, no acute distress, alert and oriented x4  Vitals:    09/23/20 0914   BP: 106/68   Pulse: 86   Temp: 98.5  F (36.9  C)   SpO2: 99%   EYES: Eyelids, conjunctiva, and sclera were normal. Pupils were normal.    HEAD, EARS, NOSE, MOUTH, AND THROAT: Head normal. Hearing was normal to voice.    NECK: Neck appearance was normal.    RESPIRATORY: Bilaterally with no crackles, wheezing or rhonchi  CARDIOVASCULAR: Regular S1 and S2.  Radial pulses intact.  No edema.  GASTROINTESTINAL: NABS, soft, nontender, nondistended, no hepatomegaly  MUSCULOSKELETAL: Skeletal configuration was normal and muscle mass was normal for age. Joint appearance  was overall normal.   SKIN/HAIR/NAILS: Skin color was normal. Hair and nails were normal. Anicteric  NEUROLOGIC: Alert and oriented to person, place, time, and circumstance. Speech was normal. Cranial nerves were normal. Motor strength was normal for age   PSYCHIATRIC:  Mood and affect were normal and the patient had normal recent and remote memory. The patient's judgment and insight were normal.    Additional Information   Current Outpatient Medications   Medication Sig Dispense Refill     ibuprofen (ADVIL,MOTRIN) 200 MG tablet Take 400 mg by mouth every 6 (six) hours as needed for pain.       No current facility-administered medications for this visit.      No Known Allergies  Social History     Social History Narrative    Single. Father of 2 children (sons, 6 and 12 in 2020) with 2 different mothers. Alcohol abuse on remission. 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 Enjoys playin golf and working out. 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      Social History     Tobacco Use     Smoking status: Current Every Day Smoker     Start date: 08/2020     Smokeless tobacco: Never Used     Tobacco comment: vape   Substance Use Topics     Alcohol use: Not Currently     Drug use: Not Currently     Types: Marijuana, Cocaine     Comment: sober since 08/2020     Family History   Problem Relation Age of Onset     Hyperlipidemia Mother      Breast cancer Mother      Prostate cancer Father      Melanoma Sister      Esophageal cancer Paternal Grandfather      Past Surgical History:   Procedure Laterality Date     WISDOM TOOTH EXTRACTION       Time: total time spent with the patient was 45 minutes of which >50% was spent in counseling and coordination of care

## 2021-06-11 NOTE — TELEPHONE ENCOUNTER
New Appointment Needed  What is the reason for the visit:    Patient says that he had seen you about 8 yrs ago but there is nothing showing this at all. He was hoping that you would be willing to take him back as a patient.. You had seen his parent Chacho and Tala Peralta as well as his grandparents  Provider Preference: PCP only  How soon do you need to be seen?: Your next available for a new pt  Waitlist offered?: No  Okay to leave a detailed message:  Yes

## 2021-06-11 NOTE — TELEPHONE ENCOUNTER
9/11 3:50 PM - Spoke to patient and relayed message that Dr. Allen is unable to see him due to his schedule, recommended to establish care with new provider.    Scheduled Est Care for pt on 9/24 with Dr. Ventura

## 2021-06-11 NOTE — TELEPHONE ENCOUNTER
New Appointment Needed  What is the reason for the visit:    Patient needs appt with Dr. Allen per message below care connection cannot schedule   Provider Preference: Dr. Ryan Allen  How soon do you need to be seen?: asap, looking for just a phone call if possible   Waitlist offered?: No  Okay to leave a detailed message:  Yes

## 2021-06-11 NOTE — TELEPHONE ENCOUNTER
Dr. Allen,     Called patient and advised Dr. Allen is out until 9/8. Will you take patient on as a new patient with [facility]?    Michelle Velazquez LPN

## 2021-06-11 NOTE — TELEPHONE ENCOUNTER
See other mychart thread.    Arina SPRAGUE LPN .......... 7:25 AM  09/24/20  [facility]

## 2021-06-11 NOTE — TELEPHONE ENCOUNTER
Would you be willing to do a telephone visit? I can get him on the schedule for a in person eat care visit for next available? Otherwise, I can suggest another provider if you prefer. Thank you.    Elli Guerra, CMA

## 2021-06-11 NOTE — TELEPHONE ENCOUNTER
I can take him on as a new patient.  However, I am booked out for a while.  Schedule him in the next available 40-minute opening.

## 2021-06-11 NOTE — TELEPHONE ENCOUNTER
Left message relaying below message. Please help patient schedule when he calls back. See below where provider states he will see him. Thank you.    Elli Guerra, CMA

## 2021-06-11 NOTE — TELEPHONE ENCOUNTER
As he is a new patient, this will be too complicated to try to manage with a telephone visit.  Unfortunately, I am booked out for many months and I am not able to schedule him as a new patient at this time.  I would recommend getting him an appointment with an available provider to address his concerns.

## 2021-06-13 NOTE — TELEPHONE ENCOUNTER
Reason for Call:  Other needs info for HSA    Detailed comments: pt states form for HSA requires providers license number     Phone Number Patient can be reached at:   Cell number on file:    Telephone Information:   Mobile [phone]       Best Time: anytime    Can we leave a detailed message on this number?: Yes    Call taken on 12/21/2020 at 12:19 PM by Charles Rivera

## 2021-06-15 NOTE — TELEPHONE ENCOUNTER
Patient called in and states that he really needs his urine drug screen results sent to him for a custody case. I looked in the labs resulted and only see that it was done on 1/18/2021 but I do not see the levels or results. He also states that he got a letter with the info we can see but court needs the actual result numbers.    He reached out to the lab that does the processing of the test but they stated he would need to get the actual report from his primary MD's office.     Please investigate this for pt he is needing this ASAP.   The lab is [facility] and the number is [phone].     Pt would like a call back with an update at home phone number. OK to leave a detailed message.

## 2021-06-15 NOTE — TELEPHONE ENCOUNTER
Report in media - dropped in the mail for pt per request.    Pt needs the print out from the media tab each time this is resulted.      Please mail the scanned copy with the breakdown of the test.

## 2021-06-21 NOTE — LETTER
Letter by Jackson Ventura MD at      Author: Jackson Ventura MD Service: -- Author Type: --    Filed:  Encounter Date: 3/1/2021 Status: (Other)         Jose SPRAGUE [place]  [address] [address] [address]                 March 1, 2021       Dear Mr. Peralta,    Here are the results you recently requested.     Please call with questions or contact us using [employer].      Sincerely,        Electronically signed by Jackson Ventura MD

## 2021-06-21 NOTE — LETTER
Letter by Jackson Ventura MD at      Author: Jackson Ventura MD Service: -- Author Type: --    Filed:  Encounter Date: 12/8/2020 Status: (Other)         Jose SPRAGUE [place]  [address] [address] [address]                 December 18, 2020       Dear Leilani Peralta,    .Here are the results that you recently requested.         Please call with questions or contact us using [employer].      Sincerely,        Electronically signed by Jackson Ventura MD

## 2021-06-21 NOTE — LETTER
Letter by Jackson Ventura MD at      Author: Jackson Ventura MD Service: -- Author Type: --    Filed:  Encounter Date: 12/21/2020 Status: (Other)         Jose Peralta  [address] [address] [facility] [address]             December 21, 2020         Dear Mr. Peralta,    Below are the results from your recent visit:    Resulted Orders   Drug Screen 9 panel serum ([facility] [id number]) - Misc. Lab Test   Result Value Ref Range    Miscellanous Test Dept. Chemistry Reference Test     Test Name        Drug Screen 9 Panel, Serum or Plasma - Immunoassay Screen with Reflex to Mass Spectrometry Confirmation/Quantitation    Performing Lab       [address] [address]    Scan Result See Scanned Report            Please call with questions or contact us using [employer].    Sincerely,        Electronically signed by Jackson Ventura MD

## 2021-09-19 ENCOUNTER — HEALTH MAINTENANCE LETTER (OUTPATIENT)
Age: 46
End: 2021-09-19

## 2022-01-09 ENCOUNTER — HEALTH MAINTENANCE LETTER (OUTPATIENT)
Age: 47
End: 2022-01-09

## 2022-11-21 ENCOUNTER — HEALTH MAINTENANCE LETTER (OUTPATIENT)
Age: 47
End: 2022-11-21

## 2023-04-16 ENCOUNTER — HEALTH MAINTENANCE LETTER (OUTPATIENT)
Age: 48
End: 2023-04-16

## 2025-05-26 ENCOUNTER — HOSPITAL ENCOUNTER (EMERGENCY)
Facility: CLINIC | Age: 50
Discharge: HOME | End: 2025-05-26
Attending: EMERGENCY MEDICINE | Admitting: EMERGENCY MEDICINE
Payer: COMMERCIAL

## 2025-05-26 ENCOUNTER — APPOINTMENT (OUTPATIENT)
Dept: CT IMAGING | Facility: CLINIC | Age: 50
End: 2025-05-26
Attending: EMERGENCY MEDICINE
Payer: COMMERCIAL

## 2025-05-26 VITALS
DIASTOLIC BLOOD PRESSURE: 78 MMHG | SYSTOLIC BLOOD PRESSURE: 124 MMHG | HEART RATE: 78 BPM | RESPIRATION RATE: 20 BRPM | HEIGHT: 71 IN | BODY MASS INDEX: 25.9 KG/M2 | WEIGHT: 185 LBS | TEMPERATURE: 99.1 F | OXYGEN SATURATION: 93 %

## 2025-05-26 DIAGNOSIS — K76.89 HEPATIC CYST: ICD-10-CM

## 2025-05-26 DIAGNOSIS — N20.1 RIGHT URETERAL STONE: ICD-10-CM

## 2025-05-26 DIAGNOSIS — Q79.0 BOCHDALEK HERNIA: ICD-10-CM

## 2025-05-26 DIAGNOSIS — R31.9 HEMATURIA, UNSPECIFIED TYPE: ICD-10-CM

## 2025-05-26 LAB
ALBUMIN SERPL BCG-MCNC: 4.4 G/DL (ref 3.5–5.2)
ALBUMIN UR-MCNC: 50 MG/DL
ALP SERPL-CCNC: 99 U/L (ref 40–150)
ALT SERPL W P-5'-P-CCNC: 28 U/L (ref 0–70)
ANION GAP SERPL CALCULATED.3IONS-SCNC: 14 MMOL/L (ref 7–15)
APPEARANCE UR: ABNORMAL
AST SERPL W P-5'-P-CCNC: 28 U/L (ref 0–45)
BASOPHILS # BLD AUTO: 0.1 10E3/UL (ref 0–0.2)
BASOPHILS NFR BLD AUTO: 0 %
BILIRUB SERPL-MCNC: 0.7 MG/DL
BILIRUB UR QL STRIP: NEGATIVE
BUN SERPL-MCNC: 25.8 MG/DL (ref 6–20)
CALCIUM SERPL-MCNC: 9.2 MG/DL (ref 8.8–10.4)
CHLORIDE SERPL-SCNC: 101 MMOL/L (ref 98–107)
COLOR UR AUTO: YELLOW
CREAT SERPL-MCNC: 1.34 MG/DL (ref 0.67–1.17)
EGFRCR SERPLBLD CKD-EPI 2021: 66 ML/MIN/1.73M2
EOSINOPHIL # BLD AUTO: 0.1 10E3/UL (ref 0–0.7)
EOSINOPHIL NFR BLD AUTO: 1 %
ERYTHROCYTE [DISTWIDTH] IN BLOOD BY AUTOMATED COUNT: 12.4 % (ref 10–15)
GLUCOSE SERPL-MCNC: 115 MG/DL (ref 70–99)
GLUCOSE UR STRIP-MCNC: NEGATIVE MG/DL
HCO3 SERPL-SCNC: 23 MMOL/L (ref 22–29)
HCT VFR BLD AUTO: 37.6 % (ref 40–53)
HGB BLD-MCNC: 12.7 G/DL (ref 13.3–17.7)
HGB UR QL STRIP: ABNORMAL
IMM GRANULOCYTES # BLD: 0.1 10E3/UL
IMM GRANULOCYTES NFR BLD: 0 %
KETONES UR STRIP-MCNC: 10 MG/DL
LEUKOCYTE ESTERASE UR QL STRIP: NEGATIVE
LYMPHOCYTES # BLD AUTO: 1.4 10E3/UL (ref 0.8–5.3)
LYMPHOCYTES NFR BLD AUTO: 10 %
MCH RBC QN AUTO: 29.1 PG (ref 26.5–33)
MCHC RBC AUTO-ENTMCNC: 33.8 G/DL (ref 31.5–36.5)
MCV RBC AUTO: 86 FL (ref 78–100)
MONOCYTES # BLD AUTO: 0.8 10E3/UL (ref 0–1.3)
MONOCYTES NFR BLD AUTO: 6 %
MUCOUS THREADS #/AREA URNS LPF: PRESENT /LPF
NEUTROPHILS # BLD AUTO: 11.6 10E3/UL (ref 1.6–8.3)
NEUTROPHILS NFR BLD AUTO: 82 %
NITRATE UR QL: NEGATIVE
NRBC # BLD AUTO: 0 10E3/UL
NRBC BLD AUTO-RTO: 0 /100
PH UR STRIP: 5.5 [PH] (ref 5–7)
PLATELET # BLD AUTO: 333 10E3/UL (ref 150–450)
POTASSIUM SERPL-SCNC: 4 MMOL/L (ref 3.4–5.3)
PROT SERPL-MCNC: 7.1 G/DL (ref 6.4–8.3)
RBC # BLD AUTO: 4.36 10E6/UL (ref 4.4–5.9)
RBC URINE: >182 /HPF
SODIUM SERPL-SCNC: 138 MMOL/L (ref 135–145)
SP GR UR STRIP: 1.03 (ref 1–1.03)
SQUAMOUS EPITHELIAL: <1 /HPF
UROBILINOGEN UR STRIP-MCNC: NORMAL MG/DL
WBC # BLD AUTO: 14.1 10E3/UL (ref 4–11)
WBC URINE: 8 /HPF

## 2025-05-26 PROCEDURE — 85004 AUTOMATED DIFF WBC COUNT: CPT | Performed by: EMERGENCY MEDICINE

## 2025-05-26 PROCEDURE — 99285 EMERGENCY DEPT VISIT HI MDM: CPT | Mod: 25

## 2025-05-26 PROCEDURE — 36415 COLL VENOUS BLD VENIPUNCTURE: CPT | Performed by: EMERGENCY MEDICINE

## 2025-05-26 PROCEDURE — 81003 URINALYSIS AUTO W/O SCOPE: CPT | Performed by: EMERGENCY MEDICINE

## 2025-05-26 PROCEDURE — 74176 CT ABD & PELVIS W/O CONTRAST: CPT

## 2025-05-26 PROCEDURE — 258N000003 HC RX IP 258 OP 636: Performed by: EMERGENCY MEDICINE

## 2025-05-26 PROCEDURE — 82310 ASSAY OF CALCIUM: CPT | Performed by: EMERGENCY MEDICINE

## 2025-05-26 PROCEDURE — 250N000011 HC RX IP 250 OP 636: Mod: JZ | Performed by: EMERGENCY MEDICINE

## 2025-05-26 PROCEDURE — 96361 HYDRATE IV INFUSION ADD-ON: CPT

## 2025-05-26 PROCEDURE — 96374 THER/PROPH/DIAG INJ IV PUSH: CPT

## 2025-05-26 RX ORDER — ONDANSETRON 4 MG/1
4 TABLET, ORALLY DISINTEGRATING ORAL EVERY 8 HOURS PRN
Qty: 10 TABLET | Refills: 0 | Status: SHIPPED | OUTPATIENT
Start: 2025-05-26

## 2025-05-26 RX ORDER — KETOROLAC TROMETHAMINE 15 MG/ML
15 INJECTION, SOLUTION INTRAMUSCULAR; INTRAVENOUS ONCE
Status: COMPLETED | OUTPATIENT
Start: 2025-05-26 | End: 2025-05-26

## 2025-05-26 RX ORDER — ONDANSETRON 2 MG/ML
4 INJECTION INTRAMUSCULAR; INTRAVENOUS ONCE
Status: DISCONTINUED | OUTPATIENT
Start: 2025-05-26 | End: 2025-05-27 | Stop reason: HOSPADM

## 2025-05-26 RX ORDER — OXYCODONE HYDROCHLORIDE 5 MG/1
5 TABLET ORAL EVERY 6 HOURS PRN
Qty: 6 TABLET | Refills: 0 | Status: SHIPPED | OUTPATIENT
Start: 2025-05-26 | End: 2025-05-29

## 2025-05-26 RX ORDER — TAMSULOSIN HYDROCHLORIDE 0.4 MG/1
0.4 CAPSULE ORAL DAILY
Qty: 10 CAPSULE | Refills: 0 | Status: SHIPPED | OUTPATIENT
Start: 2025-05-26 | End: 2025-06-05

## 2025-05-26 RX ADMIN — SODIUM CHLORIDE 1000 ML: 0.9 INJECTION, SOLUTION INTRAVENOUS at 19:56

## 2025-05-26 RX ADMIN — KETOROLAC TROMETHAMINE 15 MG: 15 INJECTION, SOLUTION INTRAMUSCULAR; INTRAVENOUS at 19:55

## 2025-05-26 ASSESSMENT — COLUMBIA-SUICIDE SEVERITY RATING SCALE - C-SSRS
6. HAVE YOU EVER DONE ANYTHING, STARTED TO DO ANYTHING, OR PREPARED TO DO ANYTHING TO END YOUR LIFE?: NO
2. HAVE YOU ACTUALLY HAD ANY THOUGHTS OF KILLING YOURSELF IN THE PAST MONTH?: NO
1. IN THE PAST MONTH, HAVE YOU WISHED YOU WERE DEAD OR WISHED YOU COULD GO TO SLEEP AND NOT WAKE UP?: NO

## 2025-05-26 ASSESSMENT — ACTIVITIES OF DAILY LIVING (ADL)
ADLS_ACUITY_SCORE: 41

## 2025-05-27 NOTE — ED PROVIDER NOTES
"  Emergency Department Note      History of Present Illness     Chief Complaint   Flank Pain (Right flank pain)      HPI   Jose Peralta is a 49 year old male presenting to the ED with flank pain. The patient reports playing golf over the past 4-days in a row, stating everything was feeling fine until he developed a sharp pain in his lower back which took his breath away earlier today. He was able to play a few more holes, though pain worsened and he returned home.  He noted radiation towards his testicles and difficulty urinating.  As per , he had been feeling such severe pain he thought he was going to die. He was nauseous, lightheaded, diaphoretic and described pain on is lower back on the right side radiating into his testicles. No prior history of kidney stones.  PMH notable for bicuspid aortic valve and aortic root dilation though no other known AAA.  Patient received 1 mg IV dilaudid en route.    Independent Historian    as detailed above.    Review of External Notes   Psychology note from 9/12/23 reviewed where patient noted to have a history of depression.      Past Medical History     Medical History and Problem List   Bicuspid aortic valve  Chemical dependency   Hypertriglyceridemia  Sciatic pain  Anemia   Aortic root dilatation   Chemical dependency   Conjunctival hemorrhage  Depression   Myopia   Cornea infiltrate  Prediabetes  Astigmatism   Tobacco use   Hyperopia         Medications   Simvastatin   Trazodone   Sertraline    Surgical History   [place] tooth extraction     Physical Exam     Patient Vitals for the past 24 hrs:   BP Temp Temp src Pulse Resp SpO2 Height Weight   05/26/25 2217 -- -- -- -- -- 93 % -- --   05/26/25 2216 124/78 -- -- 78 -- -- -- --   05/26/25 1942 -- 99.1  F (37.3  C) Oral -- -- -- -- --   05/26/25 1941 -- -- -- -- 20 -- -- --   05/26/25 1933 -- -- -- -- 16 -- -- --   05/26/25 1931 -- -- -- -- -- -- 1.803 m (5' 11\") 83.9 kg (185 lb)   05/26/25 1930 -- -- -- " -- -- 97 % -- --   05/26/25 1929 (!) 147/89 -- -- 63 -- 95 % -- --     Physical Exam  General:              Well-nourished              Speaking in full sentences   Resting comfortably on gurney  Eyes:              Conjunctiva without injection or scleral icterus  ENT:              Moist mucous membranes              Nares patent              Pinnae normal  Neck:              Full ROM              No stiffness appreciated  Resp:              Lungs CTAB              No crackles, wheezing or audible rubs              Good air movement  CV:                    Normal rate, regular rhythm              S1 and S2 present              No murmur, gallop or rub  GI:              BS present              Abdomen soft without distention              Non-tender to light and deep palpation   No palpable pulsatile mass              No guarding or rebound tenderness  Skin:              Warm, dry, well perfused              No rashes or open wounds on exposed skin  MSK:              Moves all extremities              No focal deformities or swelling  Neuro:              Alert              Answers questions appropriately              Moves all extremities equally              Gait stable  Psych:              Normal affect, normal mood    Diagnostics     Lab Results   Labs Ordered and Resulted from Time of ED Arrival to Time of ED Departure   COMPREHENSIVE METABOLIC PANEL - Abnormal       Result Value    Sodium 138      Potassium 4.0      Carbon Dioxide (CO2) 23      Anion Gap 14      Urea Nitrogen 25.8 (*)     Creatinine 1.34 (*)     GFR Estimate 66      Calcium 9.2      Chloride 101      Glucose 115 (*)     Alkaline Phosphatase 99      AST 28      ALT 28      Protein Total 7.1      Albumin 4.4      Bilirubin Total 0.7     ROUTINE UA WITH MICROSCOPIC REFLEX TO CULTURE - Abnormal    Color Urine Yellow      Appearance Urine Slightly Cloudy (*)     Glucose Urine Negative      Bilirubin Urine Negative      Ketones Urine 10 (*)      Specific Gravity Urine 1.030      Blood Urine Large (*)     pH Urine 5.5      Protein Albumin Urine 50 (*)     Urobilinogen Urine Normal      Nitrite Urine Negative      Leukocyte Esterase Urine Negative      Mucus Urine Present (*)     RBC Urine >182 (*)     WBC Urine 8 (*)     Squamous Epithelials Urine <1     CBC WITH PLATELETS AND DIFFERENTIAL - Abnormal    WBC Count 14.1 (*)     RBC Count 4.36 (*)     Hemoglobin 12.7 (*)     Hematocrit 37.6 (*)     MCV 86      MCH 29.1      MCHC 33.8      RDW 12.4      Platelet Count 333      % Neutrophils 82      % Lymphocytes 10      % Monocytes 6      % Eosinophils 1      % Basophils 0      % Immature Granulocytes 0      NRBCs per 100 WBC 0      Absolute Neutrophils 11.6 (*)     Absolute Lymphocytes 1.4      Absolute Monocytes 0.8      Absolute Eosinophils 0.1      Absolute Basophils 0.1      Absolute Immature Granulocytes 0.1      Absolute NRBCs 0.0         Imaging   Abd/pelvis CT no contrast - Stone Protocol   Final Result   IMPRESSION:    1.  4 mm obstructing stone distal right ureter resulting in mild hydronephrosis.   2.  Several additional small stones both kidneys.           Independent Interpretation   None    ED Course      Medications Administered   Medications   sodium chloride 0.9% BOLUS 1,000 mL (0 mLs Intravenous Stopped 5/26/25 2154)   ketorolac (TORADOL) injection 15 mg (15 mg Intravenous $Given 5/26/25 1955)       Procedures   Procedures     Discussion of Management   None    ED Course   ED Course as of 05/27/25 0940   Mon May 26, 2025   1938 I obtained the history and examined the patient as noted above.     2143 I rechecked the patient and explained findings; patient was discharged. Medication used was discussed and he was agreeable to use pain medications once.        Additional Documentation  None    Medical Decision Making / Diagnosis     CMS Diagnoses: None    MIPS   None               MDM   Jose Peralta is a 49 year old male who presented to the ER  for evaluation of flank pain and abdominal pain.  Vital signs on presentation reveal elevated BP, which normalized on re-check.  Differential diagnosis includes nephrolithiasis/renal colic, pyelonephritis, appendicitis, AAA, biliary colic, bowel obstruction, colitis, renal infarction, retroperitoneal disease, and testicular pathology such as torsion, epididymitis, hernia.      Patient's current presentation is felt to be most consistent with renal colic. CT confirms a 4 mm ureteral stone at the distal ureter.  Renal function notable for Cr of 1.34, up slightly from prior comparison of 1.12, though given elevated BUN and having been outside golfing much of the day, suspect a component of pre-renal cause and volume depletion CT and lab workup show no other alternative etiology that could be causing his symptoms (e.g., AAA, appendicitis, pyelonephritis). There is no fever or convincing evidence of a urinary tract infection. Incidental findings were reviewed with patient including hepatic cyst as well as residual nephrolithiasis, which can be further reviewed as outpatient with primary team, which he is understanding of.      On recheck, his pain is controlled with interventions in the ED and he is tolerating POs. I will prescribe supportive medications and Flomax to facilitate stone passage.  We had a discussion regarding opiate analgesia acknowledging patient's prior history of substance abuse.  Patient demonstrates good insight into this, and using shared decision making, have elected to provide a short course of oxycodone to assist with any breakthrough pain given the severity of his symptoms prior to arrival.  Opiate precautions reviewed including no driving while taking this. I have advised him to return for uncontrolled pain, vomiting, fever, or any other concerning symptoms. I also advised to strain his urine to look for a stone.  We discussed that this can be submitted for further analysis upon follow-up to  help delineate potential methods to minimize stone formation in the future.  Finally, I have recommended follow-up in the next 2-3 days with either their primary care provider or Urology.  Referral placed for outpatient Urology follow-up.  All questions have been answered prior to discharge.        Disposition   The patient was discharged.     Diagnosis     ICD-10-CM    1. Right ureteral stone  N20.1 Adult Urology  Referral      2. Hematuria, unspecified type  R31.9 Adult Urology  Referral      3. [place] hernia  Q79.0       4. Hepatic cyst  K76.89            Discharge Medications   Discharge Medication List as of 5/26/2025  9:55 PM        START taking these medications    Details   ondansetron (ZOFRAN ODT) 4 MG ODT tab Take 1 tablet (4 mg) by mouth every 8 hours as needed., Disp-10 tablet, R-0, E-Prescribe      oxyCODONE (ROXICODONE) 5 MG tablet Take 1 tablet (5 mg) by mouth every 6 hours as needed., Disp-6 tablet, R-0, Local Print      tamsulosin (FLOMAX) 0.4 MG capsule Take 1 capsule (0.4 mg) by mouth daily for 10 doses., Disp-10 capsule, R-0, E-Prescribe               Scribe Disclosure:  I, Kimberly Alfaro, am serving as a scribe at 8:17 PM on 5/26/2025 to document services personally performed by Shon Pacheco MD based on my observations and the provider's statements to me.        Shon Pacheco MD  05/27/25 0910

## 2025-05-27 NOTE — ED TRIAGE NOTES
Patient arrives via EMS complaining of right flank pain radiates down to testicles. Started abut 5pm while playing golf. Upon ems arrival pt wast writhing in pain on the floor. 1mg IV dilaudid given at 1900. 4mg IV zofran after large emesis in the ambulance.    Denies nausea now. Rates pain 4/10. Arrives talking on his cell phone.      Triage Assessment (Adult)       Row Name 05/26/25 1931          Triage Assessment    Airway WDL WDL        Respiratory WDL    Respiratory WDL WDL        Skin Circulation/Temperature WDL    Skin Circulation/Temperature WDL WDL        Cardiac WDL    Cardiac WDL WDL        Peripheral/Neurovascular WDL    Peripheral Neurovascular WDL WDL        Cognitive/Neuro/Behavioral WDL    Cognitive/Neuro/Behavioral WDL WDL

## 2025-05-27 NOTE — DISCHARGE INSTRUCTIONS
Follow-up:  Please follow-up with Urology or your Primary Care Provider in 3-5 days for re-evaluation and discussion of your visit to the emergency department today.  Try to catch your kidney stone using the urinary strainer, and bring this with you to your urology appointment.  Determining what kind of kidney stone you have might help preventing them in the future.    Home treatments:  Recommended home therapies include drinking plenty of fluids to stay hydrated.  You may resume your regular diet.  Take your medications as prescribed.    New prescriptions:  Acetaminophen (Tylenol) or Ibuprofen for pain (so long as these medications are safe for you)    Zofran can be taken for nausea as needed    Flomax can be taken to help aid in passing the stone (be aware this may cause dizziness / lightheadedness particularly when standing, so monitor closely for this)    Oxycodone for pain which does not respond to the above measures (Risks include addictive potential, sedation/drowsiness so do NOT drive or operate heavy machinery while taking this medication, constipation, among others)    Return precautions:  Warning signs which should prompt you to return to the ER include if you develop fever, develop recurrent vomiting, are unable to urinate, cannot keep fluids down, or become worse in any way.    Thank you for allowing us to participate in your care today.      Discharge Instructions  Kidney Stones    Kidney stones are a common problem that can cause a lot of pain but fortunately are usually not dangerous. Kidney stones form in the kidney and then can cause a blockage (obstruction) of the flow of urine from the kidney which leads to pain. Most patients can manage kidney stones at home (without a hospital stay).  However, sometimes your condition may be worse than it seemed at first, or may get worse with time. Most kidney stones will pass on their own, but occasionally stones may need to be removed by an  urologist.    Generally, every Emergency Department visit should have a follow-up clinic visit with either a primary or a specialty clinic/provider. Please follow-up as instructed by your emergency provider today.      Return to the Emergency Department if:  Your pain is not controlled despite the medications provided or recommended.  You are vomiting (throwing up) and cannot keep fluids or medications down.  You develop a fever (>100.4 F).  You feel much more ill or develop new symptoms.  What can I do to help myself?  Be sure to drink plenty of fluids.  If instructed to do so, strain your urine (pee) with the urine strainer you were provided with today. Your stone may look like a grain of sand or a small pebble. Collect any stones in the cup provided and bring to your follow-up appointment.  Staying active is good, and may help the stone to pass. You may do whatever you feel up to doing without restrictions.   Treatment:  Non-steroidal anti-inflammatory drugs (NSAIDs). This includes prescription medicines like Toradol  (ketorolac) and non-prescription medicines like Advil  (ibuprofen) and Nuprin  (ibuprofen) and Naproxen. These pain relievers are very effective for kidney stones.  Nausea (sick to your stomach) medication.  Nausea and vomiting are common with kidney stones, so your provider may send you home with medicine for this.   Flomax  (tamsulosin). This medicine is sometimes used for men with prostate problems, but also can help kidney stones to pass. Its effectiveness is controversial or questionable so it is prescribed in certain situations. This medicine can lower blood pressure, and you may feel faint/lightheaded, especially when you first stand up. Be sure to get up gradually, sit down if you feel faint, and avoid activity where feeling faint would be dangerous, such as climbing ladders.  If you were given a prescription for medicine here today, be sure to read all of the information (including the  package insert) that comes with your prescription.  This will include important information about the medicine, its side effects, and any warnings that you need to know about.  The pharmacist who fills the prescription can provide more information and answer questions you may have about the medicine.  If you have questions or concerns that the pharmacist cannot address, please call or return to the Emergency Department.   Remember that you can always come back to the Emergency Department if you are not able to see your regular provider in the amount of time listed above, if you get any new symptoms, or if there is anything that worries you.